# Patient Record
Sex: FEMALE | Race: BLACK OR AFRICAN AMERICAN | Employment: OTHER | URBAN - METROPOLITAN AREA
[De-identification: names, ages, dates, MRNs, and addresses within clinical notes are randomized per-mention and may not be internally consistent; named-entity substitution may affect disease eponyms.]

---

## 2021-06-09 ENCOUNTER — HOSPITAL ENCOUNTER (EMERGENCY)
Facility: HOSPITAL | Age: 41
Discharge: HOME | End: 2021-06-09
Payer: MEDICARE

## 2021-06-09 VITALS
HEART RATE: 79 BPM | RESPIRATION RATE: 18 BRPM | BODY MASS INDEX: 31.39 KG/M2 | SYSTOLIC BLOOD PRESSURE: 113 MMHG | HEIGHT: 67 IN | TEMPERATURE: 97 F | WEIGHT: 200 LBS | OXYGEN SATURATION: 100 % | DIASTOLIC BLOOD PRESSURE: 69 MMHG

## 2021-06-09 DIAGNOSIS — N39.0 URINARY TRACT INFECTION WITHOUT HEMATURIA, SITE UNSPECIFIED: Primary | ICD-10-CM

## 2021-06-09 DIAGNOSIS — N89.8 VAGINAL DISCHARGE: ICD-10-CM

## 2021-06-09 PROCEDURE — 81001 URINALYSIS AUTO W/SCOPE: CPT

## 2021-06-09 PROCEDURE — 87491 CHLMYD TRACH DNA AMP PROBE: CPT | Performed by: NURSE PRACTITIONER

## 2021-06-09 PROCEDURE — 87591 N.GONORRHOEAE DNA AMP PROB: CPT | Performed by: NURSE PRACTITIONER

## 2021-06-09 PROCEDURE — 87205 SMEAR GRAM STAIN: CPT | Performed by: NURSE PRACTITIONER

## 2021-06-09 PROCEDURE — 81025 URINE PREGNANCY TEST: CPT

## 2021-06-09 PROCEDURE — 87086 URINE CULTURE/COLONY COUNT: CPT

## 2021-06-09 PROCEDURE — 87186 SC STD MICRODIL/AGAR DIL: CPT

## 2021-06-09 PROCEDURE — 99284 EMERGENCY DEPT VISIT MOD MDM: CPT

## 2021-06-09 PROCEDURE — 87106 FUNGI IDENTIFICATION YEAST: CPT | Performed by: NURSE PRACTITIONER

## 2021-06-09 PROCEDURE — 87808 TRICHOMONAS ASSAY W/OPTIC: CPT | Performed by: NURSE PRACTITIONER

## 2021-06-09 PROCEDURE — 87077 CULTURE AEROBIC IDENTIFY: CPT

## 2021-06-09 RX ORDER — MYCOPHENOLATE MOFETIL 500 MG/1
1000 TABLET ORAL 2 TIMES DAILY
COMMUNITY

## 2021-06-09 RX ORDER — GABAPENTIN 300 MG/1
300 CAPSULE ORAL DAILY
COMMUNITY

## 2021-06-09 RX ORDER — GABAPENTIN 600 MG/1
600 TABLET ORAL NIGHTLY
COMMUNITY

## 2021-06-09 RX ORDER — CEPHALEXIN 500 MG/1
500 CAPSULE ORAL EVERY 12 HOURS
Qty: 14 CAPSULE | Refills: 0 | Status: SHIPPED | OUTPATIENT
Start: 2021-06-09 | End: 2021-06-16

## 2021-06-10 NOTE — ED PROVIDER NOTES
Patient Seen in: [facility] [place] AND [facility] Emergency Department      History   Patient presents with:  Abdominal Pain    Stated Complaint: abdominal pain     HPI/Subjective:   HPI    72-year-old female presents the emergency department for evaluation.   Patient s Constitutional:       Appearance: Normal appearance. She is well-developed. HENT:      Head: Normocephalic and atraumatic.       Right Ear: External ear normal.      Left Ear: External ear normal.      Nose: Nose normal.      Mouth/Throat:      Mouth: M - Normal   GENITAL VAGINOSIS SCREEN   CHLAMYDIA/GONOCOCCUS, BRENDA   URINE CULTURE, ROUTINE     Lab Results   Component Value Date    COLORUR Yellow 06/09/2021    CLARITY Hazy 06/09/2021    SPECGRAVITY 1.023 06/09/2021    GLUUR Negative 06/09/2021    BILUR Ne

## 2021-06-10 NOTE — ED INITIAL ASSESSMENT (HPI)
Patient c/o lower abdominal pain with vaginal discharge for the past week. C/o pain with urination for the past 2 days. Denies hematuria.

## 2021-06-10 NOTE — ED QUICK NOTES
Pt A&OX4, discharge paperwork, prescription, at-home care, and follow-up discussed with pt, pt verbally understands them. Pt discharged ambulatory with steady gait - no distress noted.